# Patient Record
Sex: MALE | URBAN - METROPOLITAN AREA
[De-identification: names, ages, dates, MRNs, and addresses within clinical notes are randomized per-mention and may not be internally consistent; named-entity substitution may affect disease eponyms.]

---

## 2022-12-08 ENCOUNTER — TELEPHONE (OUTPATIENT)
Dept: NEPHROLOGY | Facility: CLINIC | Age: 42
End: 2022-12-08

## 2022-12-08 NOTE — TELEPHONE ENCOUNTER
12/08/22-2:38 PM-I called Zulma (patients wife) and informed her of what [facility] recommended. Patients wife notified. She said he sees Dr. Grider (Urologist) regularly for his solitary kidney. She also said the Potassium was rechecked and addressed in the Hospital. She will have Dr. Grider or PCP monitor kidney function through labs.       12/08/22-Referral message sent to Dr. Mai-Attn: Deanna Martinez-Sabrina WATERMAN reviewed this Referral. 5+ years of CKD Stage 2 (stable). Currently CKD Stage 3a. At this present time we are scheduling 3 b or greater.Make sure Provider addressed elevated Potassium , drink at least 64 oz of water, NO NSAIDS. If falls to a Ckd 3b or greater Refer back to Nephrology.